# Patient Record
Sex: FEMALE | Race: WHITE | Employment: FULL TIME | URBAN - NONMETROPOLITAN AREA
[De-identification: names, ages, dates, MRNs, and addresses within clinical notes are randomized per-mention and may not be internally consistent; named-entity substitution may affect disease eponyms.]

---

## 2021-08-17 ENCOUNTER — OFFICE VISIT (OUTPATIENT)
Dept: PRIMARY CARE CLINIC | Age: 46
End: 2021-08-17

## 2021-08-17 VITALS
BODY MASS INDEX: 39.87 KG/M2 | OXYGEN SATURATION: 97 % | WEIGHT: 225 LBS | DIASTOLIC BLOOD PRESSURE: 90 MMHG | RESPIRATION RATE: 20 BRPM | HEART RATE: 92 BPM | HEIGHT: 63 IN | SYSTOLIC BLOOD PRESSURE: 120 MMHG | TEMPERATURE: 98.5 F

## 2021-08-17 DIAGNOSIS — Z76.89 RETURN TO WORK EXAM: Primary | ICD-10-CM

## 2021-08-17 DIAGNOSIS — S93.492S: ICD-10-CM

## 2021-08-17 PROBLEM — S93.492A TEAR OF TALOFIBULAR LIGAMENT OF LEFT LOWER EXTREMITY: Status: ACTIVE | Noted: 2021-08-17

## 2021-08-17 ASSESSMENT — PATIENT HEALTH QUESTIONNAIRE - PHQ9
SUM OF ALL RESPONSES TO PHQ9 QUESTIONS 1 & 2: 0
2. FEELING DOWN, DEPRESSED OR HOPELESS: 0
1. LITTLE INTEREST OR PLEASURE IN DOING THINGS: 0
SUM OF ALL RESPONSES TO PHQ QUESTIONS 1-9: 0

## 2021-08-17 ASSESSMENT — ENCOUNTER SYMPTOMS: ABDOMINAL PAIN: 0

## 2021-09-14 NOTE — PROGRESS NOTES
Form received in Medical Release for processing.  Please note that it takes 7-10 business days for completion.    Received signed authorization (third party).   Gabrielstaileana  Sudha Sanchez [id number] [facility],[address] [facility]  Dept: [phone]  Loc: [phone]    Shilpi Espinoza (:  1975) is a 40 y.o. female,New patient, here for evaluation of the following chief complaint(s):  Employment Physical (return to work)      ASSESSMENT/PLAN:  1. Return to work exam  2. Tear of talofibular ligament of left lower extremity, sequela    Per review of [facility] records RTW with no restrictions  She was able to complete the stairs, kneeling and squatting and lifting components consistent with  General job description  No followup on file. No follow-ups on file. SUBJECTIVE/OBJECTIVE:  Frandy Nix is here for a RTW physical. She had sprain to her let ankle and has been being seen and evaluated for this with [facility]  Prior MRI 2021 and history of uses of ASO brace. She recently was given work slip to RTW without restrictions. Initial date of injury 2021      Review of Systems   Constitutional: Negative for chills, diaphoresis, fatigue and fever. Cardiovascular: Negative for chest pain. Gastrointestinal: Negative for abdominal pain. Genitourinary: Negative for difficulty urinating. Musculoskeletal: Negative for arthralgias, gait problem, joint swelling and myalgias. Skin: Negative. Neurological: Negative for dizziness and weakness. Hematological: Negative for adenopathy. Physical Exam  Vitals reviewed. Constitutional:       General: She is not in acute distress. Appearance: Normal appearance. She is not ill-appearing. HENT:      Head: Normocephalic. Nose: Nose normal.      Mouth/Throat:      Mouth: Mucous membranes are moist.   Eyes:      Extraocular Movements: Extraocular movements intact. Pupils: Pupils are equal, round, and reactive to light. Cardiovascular:      Rate and Rhythm: Normal rate. Pulses: Normal pulses.    Pulmonary:      Effort: Pulmonary effort is normal.   Abdominal:      Palpations: Abdomen is soft. Musculoskeletal:         General: No swelling, tenderness or deformity. Normal range of motion. Right lower leg: No edema. Left lower leg: No edema. Lymphadenopathy:      Cervical: No cervical adenopathy. Skin:     General: Skin is warm and dry. Capillary Refill: Capillary refill takes less than 2 seconds. Neurological:      Mental Status: She is alert and oriented to person, place, and time. Motor: Motor function is intact. Coordination: Romberg sign negative. Psychiatric:         Mood and Affect: Mood normal.         Behavior: Behavior normal.               Additional Information:    BP (!) 120/90   Pulse 92   Temp 98.5 °F (36.9 °C)   Resp 20   Ht 5' 2.5\" (1.588 m)   Wt 225 lb (102.1 kg)   SpO2 97%   BMI 40.50 kg/m²     An electronic signature was used to authenticate this note.     --MARLO Sigala - CNP

## 2022-04-08 ENCOUNTER — OFFICE VISIT (OUTPATIENT)
Dept: PRIMARY CARE CLINIC | Age: 47
End: 2022-04-08

## 2022-04-08 VITALS
SYSTOLIC BLOOD PRESSURE: 122 MMHG | OXYGEN SATURATION: 98 % | HEART RATE: 84 BPM | RESPIRATION RATE: 18 BRPM | WEIGHT: 223 LBS | DIASTOLIC BLOOD PRESSURE: 74 MMHG | BODY MASS INDEX: 39.51 KG/M2 | HEIGHT: 63 IN

## 2022-04-08 DIAGNOSIS — Z90.49 S/P CHOLE: ICD-10-CM

## 2022-04-08 DIAGNOSIS — Z76.89 RETURN TO WORK EVALUATION: Primary | ICD-10-CM

## 2022-04-08 RX ORDER — ERGOCALCIFEROL 1.25 MG/1
CAPSULE ORAL
COMMUNITY
Start: 2022-01-21

## 2022-04-08 ASSESSMENT — ENCOUNTER SYMPTOMS
VOMITING: 0
NAUSEA: 0
RESPIRATORY NEGATIVE: 1
ABDOMINAL PAIN: 1
DIARRHEA: 1
ABDOMINAL DISTENTION: 1
BLOOD IN STOOL: 0
COLOR CHANGE: 0

## 2022-04-08 NOTE — LETTER
[address] [facility] [address] 595 [facility]  Phone: [phone]  Fax: [phone]    MARLO Jack CNP        April 8, 2022     Patient: Man Jaquez   YOB: 1975   Date of Visit: 4/8/2022       To Whom It May Concern: It is my medical opinion that Man Jaquez may return to light duty immediately with the following restrictions: lifting/carrying not to exceed 25 lbs. Push/pull not to exceed 25#. Evaluate again on 4/14 for release to full duty. If you have any questions or concerns, please don't hesitate to call.     Sincerely,        MARLO Jack CNP

## 2022-04-08 NOTE — PROGRESS NOTES
Sowmya  Sudha Sanchez [id number] [facility],[address] [facility]  Dept: [phone]  Loc: 1910 Timmy Payne (:  1975) is a 56 y.o. female,Established patient, here for evaluation of the following chief complaint(s):  Employment Physical (return to work physical, post gallbladder removed. )      ASSESSMENT/PLAN:  1. Return to work evaluation  2. S/P pamela  Follow recommended push pull restrictions with lifting restrictions no more than 25#  Follow up 2022 for evaluation to release to full duty. Able to demonstrate twisting, bending, lifting up to 25# today, overhead reach   No follow-ups on file. SUBJECTIVE/OBJECTIVE:  Dago Roman presents with RTW physical request for her job at [employer]  She had Lap Pamela 3/29/22  She is having mild bloating and light  tenderness with palpation of her umbilical region. She reports she is ready to RTW Monday  She described her role and indicates she does mild lifting and will carry a box and hose to perform her role. When carrying the box she described how she transports it, and it appears with the motion she described that this will rest against her abdomen. No nausea, vomiting, fever and no post op complications noted in progress notes  Her surgeon has recommended RTW  with restrictions        Review of Systems   Constitutional: Negative. Respiratory: Negative. Cardiovascular: Negative. Gastrointestinal: Positive for abdominal distention, abdominal pain and diarrhea. Negative for blood in stool, nausea and vomiting. Genitourinary: Negative. Skin: Negative for color change and wound. Neurological: Negative. Psychiatric/Behavioral: Negative. Physical Exam  Constitutional:       Appearance: Normal appearance. She is not ill-appearing. Cardiovascular:      Rate and Rhythm: Normal rate. Pulses: Normal pulses.    Pulmonary:      Effort: Pulmonary effort is normal.   Abdominal:      General: Bowel sounds are normal. There is distension. Palpations: Abdomen is soft. There is no mass. Tenderness: There is abdominal tenderness. There is no guarding. Hernia: No hernia is present. Skin:     General: Skin is warm and dry. Capillary Refill: Capillary refill takes 2 to 3 seconds. Findings: No bruising or erythema. Comments: Healing portal sites from laparoscopic procedure  No drainage, no bruising or signs of infection noted   Neurological:      Mental Status: She is alert and oriented to person, place, and time. Operative report and progress records reviewed for preparation for this visit    On this date 4/8/2022 I have spent 30 minutes reviewing previous notes, test results and face to face with the patient discussing the diagnosis and importance of compliance with the treatment plan as well as documenting on the day of the visit. Additional Information:    /74   Pulse 84   Resp 18   Ht 5' 2.5\" (1.588 m)   Wt 223 lb (101.2 kg)   SpO2 98%   BMI 40.14 kg/m²     An electronic signature was used to authenticate this note.     --MARLO Rodriguez - CNP

## 2022-04-08 NOTE — PATIENT INSTRUCTIONS
Follow recommended push pull restrictions with lifting restrictions no more than 25#  Follow 4/14/2022 for evaluation to release to full duty.

## 2022-04-14 ENCOUNTER — OFFICE VISIT (OUTPATIENT)
Dept: PRIMARY CARE CLINIC | Age: 47
End: 2022-04-14

## 2022-04-14 VITALS — BODY MASS INDEX: 39.51 KG/M2 | HEIGHT: 63 IN | WEIGHT: 223 LBS

## 2022-04-14 DIAGNOSIS — Z90.49 S/P CHOLE: Primary | ICD-10-CM

## 2022-04-14 ASSESSMENT — ENCOUNTER SYMPTOMS
VOMITING: 0
RESPIRATORY NEGATIVE: 1
DIARRHEA: 0
ABDOMINAL DISTENTION: 1
COLOR CHANGE: 0
ABDOMINAL PAIN: 0
BLOOD IN STOOL: 0
NAUSEA: 0

## 2022-04-14 NOTE — LETTER
[address] [facility] [address] 595 [facility]  Phone: [phone]  Fax: [phone]    MARLO Melo CNP        April 14, 2022     Patient: Vinod Rangel   YOB: 1975   Date of Visit: 4/14/2022       To Whom It May Concern: It is my medical opinion that Vinod Rangel may return to full duty immediately with no restrictions. If you have any questions or concerns, please don't hesitate to call.     Sincerely,        MARLO Melo CNP

## 2022-04-14 NOTE — PROGRESS NOTES
[place]  [facility] [address] 22 595 [facility]  Dept: [phone]  Loc: [phone]    Man Jaquez (:  1975) is a 56 y.o. female,Established patient, here for evaluation of the following chief complaint(s):  Follow-up (follow up for return to work eval, returned on , reports no issues. )      ASSESSMENT/PLAN:  1. S/P karina  Follow up for evaluation to release to full duty. She has no pain, mild bloating. She reports she is has been able to work without difficulty. She was able to demonstrate twisting, bending, lifting  And should be able to RTW with no restrictions  Voiced understanding of instructions. Return if symptoms worsen or fail to improve. SUBJECTIVE/OBJECTIVE:  Danita Meraz presents with a follow up after RTW post op for her job at [employer]  She had Lap Karina 3/29/22 was having mild bloating and light tenderness with palpation of her umbilical region. Today reports no nausea, vomiting, fever and no post op complications. Review of Systems   Constitutional: Negative. Respiratory: Negative. Cardiovascular: Negative. Gastrointestinal: Positive for abdominal distention. Negative for abdominal pain, blood in stool, diarrhea, nausea and vomiting. Genitourinary: Negative. Skin: Negative for color change and wound. Neurological: Negative. Psychiatric/Behavioral: Negative. Physical Exam  Constitutional:       Appearance: Normal appearance. She is not ill-appearing. Cardiovascular:      Rate and Rhythm: Normal rate. Pulses: Normal pulses. Pulmonary:      Effort: Pulmonary effort is normal.   Abdominal:      General: Bowel sounds are normal. There is distension. Palpations: Abdomen is soft. There is no mass. Tenderness: There is no abdominal tenderness. There is no guarding. Hernia: No hernia is present. Skin:     General: Skin is warm and dry.       Capillary Refill: Capillary refill takes 2 to 3 seconds. Findings: No bruising or erythema. Comments: Healing portal sites from laparoscopic procedure  No drainage, no bruising or signs of infection noted   Neurological:      Mental Status: She is alert and oriented to person, place, and time. Operative report and progress records reviewed for preparation for this visit    On this date 4/8/2022 I have spent 30 minutes reviewing previous notes, test results and face to face with the patient discussing the diagnosis and importance of compliance with the treatment plan as well as documenting on the day of the visit. Additional Information:    Ht 5' 2.5\" (1.588 m)   Wt 223 lb (101.2 kg)   BMI 40.14 kg/m²     An electronic signature was used to authenticate this note.     --MARLO Marquez - CNP

## 2022-10-18 ENCOUNTER — OFFICE VISIT (OUTPATIENT)
Dept: PRIMARY CARE CLINIC | Age: 47
End: 2022-10-18

## 2022-10-18 VITALS
HEIGHT: 63 IN | OXYGEN SATURATION: 98 % | BODY MASS INDEX: 39.69 KG/M2 | WEIGHT: 224 LBS | RESPIRATION RATE: 16 BRPM | DIASTOLIC BLOOD PRESSURE: 72 MMHG | SYSTOLIC BLOOD PRESSURE: 118 MMHG | HEART RATE: 95 BPM

## 2022-10-18 DIAGNOSIS — Z76.89 RETURN TO WORK EVALUATION: Primary | ICD-10-CM

## 2022-10-18 RX ORDER — PROPRANOLOL HYDROCHLORIDE 20 MG/1
TABLET ORAL
COMMUNITY
Start: 2022-09-23

## 2022-10-18 ASSESSMENT — ENCOUNTER SYMPTOMS: RESPIRATORY NEGATIVE: 1

## 2022-10-18 NOTE — LETTER
[address]  Phone: [phone]  Fax: [phone]    MARLO Rock CNP        October 18, 2022     Patient: Damaris Collins   YOB: 1975   Date of Visit: 10/18/2022       To Whom It May Concern: It is my medical opinion that Damaris Collins may return to full duty 10/19/2022 with no restrictions. If you have any questions or concerns, please don't hesitate to call.     Sincerely,        MARLO Rock CNP

## 2022-10-18 NOTE — PROGRESS NOTES
[address]  Dept: [phone]    Rekha Potts (:  1975) is a 56 y.o. female,Established patient, here for evaluation of the following chief complaint(s): Other (Return to work physical, full duty )      ASSESSMENT/PLAN:  1. Return to work evaluation  Follow pcp recommendations for continued treatment plan and counseling  Today all documentation shows clearance  She denies any residual MH concerns preventing her to be able to rtw  She was able to perform  requirements for ambulating, lifting, stairs, lifting, bending squatting  Follow up with pcp for any residual problems related to H  Return if symptoms worsen or fail to improve. SUBJECTIVE/OBJECTIVE:  Maine [place] is here today for RTW clearance   She has bene out of work since end  through today for mental health issues  Her care has been managed by her PCP  We received documentation from PCP with office note from  and clearance. Today only taking propanolol      Review of Systems   Constitutional: Negative. Respiratory: Negative. Cardiovascular: Negative. Neurological: Negative. Psychiatric/Behavioral:  Negative for decreased concentration and dysphoric mood. The patient is not nervous/anxious. Physical Exam  Constitutional:       Appearance: Normal appearance. HENT:      Head: Normocephalic. Cardiovascular:      Rate and Rhythm: Normal rate and regular rhythm. Pulses: Normal pulses. Heart sounds: Normal heart sounds. Pulmonary:      Effort: Pulmonary effort is normal.      Breath sounds: Normal breath sounds. Abdominal:      Palpations: Abdomen is soft. Musculoskeletal:         General: Normal range of motion. Skin:     General: Skin is warm and dry. Neurological:      General: No focal deficit present. Mental Status: She is alert and oriented to person, place, and time. Psychiatric:         Mood and Affect: Mood normal.         Behavior: Behavior normal.         Thought Content: Thought content normal.         Judgment: Judgment normal.             Additional Information:    /72 (Site: Left Upper Arm, Position: Sitting, Cuff Size: Large Adult)   Pulse 95   Resp 16   Ht 5' 2.5\" (1.588 m)   Wt 224 lb (101.6 kg)   SpO2 98%   BMI 40.32 kg/m²     An electronic signature was used to authenticate this note.     --MARLO Ashby - CNP